# Patient Record
Sex: FEMALE | Race: WHITE | NOT HISPANIC OR LATINO
[De-identification: names, ages, dates, MRNs, and addresses within clinical notes are randomized per-mention and may not be internally consistent; named-entity substitution may affect disease eponyms.]

---

## 2018-12-28 ENCOUNTER — TRANSCRIPTION ENCOUNTER (OUTPATIENT)
Age: 58
End: 2018-12-28

## 2019-04-16 ENCOUNTER — TRANSCRIPTION ENCOUNTER (OUTPATIENT)
Age: 59
End: 2019-04-16

## 2022-04-11 ENCOUNTER — APPOINTMENT (OUTPATIENT)
Dept: ORTHOPEDIC SURGERY | Facility: CLINIC | Age: 62
End: 2022-04-11
Payer: OTHER MISCELLANEOUS

## 2022-06-13 ENCOUNTER — APPOINTMENT (OUTPATIENT)
Dept: ORTHOPEDIC SURGERY | Facility: CLINIC | Age: 62
End: 2022-06-13
Payer: OTHER MISCELLANEOUS

## 2022-06-13 VITALS — WEIGHT: 180 LBS | HEIGHT: 65 IN | BODY MASS INDEX: 29.99 KG/M2

## 2022-06-13 PROCEDURE — 99072 ADDL SUPL MATRL&STAF TM PHE: CPT

## 2022-06-13 PROCEDURE — 99213 OFFICE O/P EST LOW 20 MIN: CPT

## 2022-06-13 NOTE — DISCUSSION/SUMMARY
[de-identified] : The patient was advised of the diagnosis.  The natural history of the pathology was explained in full to the patient in layman's terms. All questions were answered.  The risks and benefits of surgical and non-surgical treatment alternatives were explained in full to the patient.\par \par The patient demonstrated a full understanding of the surgical and non-surgical options.  The risks of surgery were outlined in full to the patient including but not limited to bleeding, scarring, infection, sepsis, neurologic injury, vascular injury, failure to resolve symptoms, symptom recurrence, the need for further surgery, non-healing, wound breakdown, deep vein thrombosis, pulmonary embolism, spontaneous osteonecrosis, anesthesia complications and even death.  The patient understood all the risks and accepted them and understood that other complications could occur that are not mentioned above.  The intraoperative plan, post-operative plan, post-operative expectations and limitations were explained in full.  Expectations from non-surgical treatment were explained in full as well.  The patient demonstrated a complete understanding of the treatment alternatives and requested the above-mentioned procedure.  This will be scheduled accordingly.\par

## 2022-06-13 NOTE — ASSESSMENT
[FreeTextEntry1] : Previous doc:\par Adv PF OA. No significant damage seen medial and lateral compartment. 1 week relief from cortisone injections in the past. Currently getting HA injections but no longer helping like in the past. Her pain is out of proportion to the severity of OA that I see today. Recc she completes HA injections as scheduled and then get new MRI right knee to eval for progression of medial/lateral OA.\par 2/14/22: Cont pain - MRI reviewed and has very clear difference compared to old MRI in her anterior horn lateral meniscus - although tears in the ant horn lat meniscus are rare this could account for her symptoms, she does not have enough OA for TKA at this point and risks for her with a knee scope are low - recc proceed with right knee arthroscopy, partial lateral meniscectomy.\par \par 6/13/22: No change - significant right knee pain, need to proceed with knee arthroscopy, auth is pending.

## 2022-06-13 NOTE — HISTORY OF PRESENT ILLNESS
[Gradual] : gradual [Result of repetitive motion] : result of repetitive motion [8] : 8 [Dull/Aching] : dull/aching [Constant] : constant [Disabled] : Work status: disabled [de-identified] : 6/13/22: Cont pain - no auth for knee scope since last visit.\par \par Previous doc:\par WC 8/16/10.\par Mechanical fall at work in 2010 - sustained meniscus tear, had scope which helped initially. Mult injections postop with some relief but lately pain has been worsening and injections have not worked. She is currently getting euflexxa with Dr. Chavez and is scheduled to have inj #3 tomorrow. Hiatal hernia repair 2018 with postop PE.\par 2/14/22: Cont right knee pain, had MRI done after last visit. Euflexxa was done which helped a little but still has severe right knee pain. [] : no [de-identified] : inj

## 2022-06-13 NOTE — IMAGING
[de-identified] : Right Knee: Palpation of the knee is as follows: anterior tenderness. Knee Range of Motion is as follows: Extension: 0 degrees. Flexion: 125 degrees. Gait and function is as follows: patient ambulates without assistive device. \par

## 2022-06-22 ENCOUNTER — APPOINTMENT (OUTPATIENT)
Dept: ORTHOPEDIC SURGERY | Facility: CLINIC | Age: 62
End: 2022-06-22
Payer: MEDICARE

## 2022-06-22 VITALS — BODY MASS INDEX: 29.99 KG/M2 | HEIGHT: 65 IN | WEIGHT: 180 LBS

## 2022-06-22 PROCEDURE — 99213 OFFICE O/P EST LOW 20 MIN: CPT | Mod: 25

## 2022-06-22 PROCEDURE — 20610 DRAIN/INJ JOINT/BURSA W/O US: CPT | Mod: LT

## 2022-06-22 PROCEDURE — 73564 X-RAY EXAM KNEE 4 OR MORE: CPT | Mod: LT

## 2022-06-22 NOTE — PROCEDURE
[Large Joint Injection] : Large joint injection [Left] : of the left [Knee] : knee [Pain] : pain [Inflammation] : inflammation [X-ray evidence of Osteoarthritis on this or prior visit] : x-ray evidence of Osteoarthritis on this or prior visit [Repeat series performed] : repeat series performed [Alcohol] : alcohol [Betadine] : betadine [Ethyl Chloride sprayed topically] : ethyl chloride sprayed topically [Sterile technique used] : sterile technique used [___ cc    6mg] :  Betamethasone (Celestone) ~Vcc of 6mg [___ cc    1%] : Lidocaine ~Vcc of 1%  [] : Patient tolerated procedure well [Call if redness, pain or fever occur] : call if redness, pain or fever occur [Apply ice for 15min out of every hour for the next 12-24 hours as tolerated] : apply ice for 15 minutes out of every hour for the next 12-24 hours as tolerated [Patient was advised to rest the joint(s) for ____ days] : patient was advised to rest the joint(s) for [unfilled] days [Previous OTC use and PT nontherapeutic] : patient has tried OTC's including aspirin, Ibuprofen, Aleve, etc or prescription NSAIDS, and/or exercises at home and/or physical therapy without satisfactory response [Patient had decreased mobility in the joint] : patient had decreased mobility in the joint [Risks, benefits, alternatives discussed / Verbal consent obtained] : the risks benefits, and alternatives have been discussed, and verbal consent was obtained

## 2022-06-22 NOTE — ASSESSMENT
[FreeTextEntry1] : left knee OA\par xrays left knee 6/22/22 showing left knee PF mild to moderate degenerative changes.\par treatment options reviewed.\par She opted for CSI left knee. Tolerated well.\par Can consider visco if symptoms persist.

## 2022-06-22 NOTE — HISTORY OF PRESENT ILLNESS
[Gradual] : gradual [Result of repetitive motion] : result of repetitive motion [7] : 7 [Dull/Aching] : dull/aching [Constant] : constant [de-identified] : 6/22/22: 62 yo female here for the evaluation of her left knee. Pain worsening to the left knee over the last 6 months.Pain is worse at night and with bending. She is using voltaren gel without relief. Is seeing Dr Chaudhry for right knee pain and awaiting auth for knee arthroscopy. Pain from overcompensation. Recent spinal cord stimulator placed in 3/2022.  Cannot get an MRI. Renal insuffiecient and no NSAIDs as per nephrologist. She has been using tizanidine.  [] : no [de-identified] : cane

## 2022-06-22 NOTE — HISTORY OF PRESENT ILLNESS
[Gradual] : gradual [Result of repetitive motion] : result of repetitive motion [7] : 7 [Dull/Aching] : dull/aching [Constant] : constant [de-identified] : 6/22/22: 62 yo female here for the evaluation of her left knee. Pain worsening to the left knee over the last 6 months.Pain is worse at night and with bending. She is using voltaren gel without relief. Is seeing Dr Chaudhry for right knee pain and awaiting auth for knee arthroscopy. Pain from overcompensation. Recent spinal cord stimulator placed in 3/2022.  Cannot get an MRI. Renal insuffiecient and no NSAIDs as per nephrologist. She has been using tizanidine.  [] : no [de-identified] : cane

## 2022-06-22 NOTE — PHYSICAL EXAM
[Left] : left knee [NL (0)] : extension 0 degrees [5___] : quadriceps 5[unfilled]/5 [] : lateral joint line tenderness [TWNoteComboBox7] : flexion 135 degrees

## 2022-06-22 NOTE — REASON FOR VISIT
[FreeTextEntry2] : 06/22/2022 This is a 61 year female  present  today  left knee. Rt Knee will schedule TKA with Dr Chaudhry\monica \par

## 2022-07-11 ENCOUNTER — APPOINTMENT (OUTPATIENT)
Dept: ORTHOPEDIC SURGERY | Facility: CLINIC | Age: 62
End: 2022-07-11

## 2022-07-11 VITALS — BODY MASS INDEX: 29.99 KG/M2 | WEIGHT: 180 LBS | HEIGHT: 65 IN

## 2022-07-11 PROCEDURE — 99072 ADDL SUPL MATRL&STAF TM PHE: CPT

## 2022-07-11 PROCEDURE — 20611 DRAIN/INJ JOINT/BURSA W/US: CPT | Mod: RT

## 2022-07-11 PROCEDURE — 99214 OFFICE O/P EST MOD 30 MIN: CPT | Mod: 25

## 2022-07-11 PROCEDURE — J3490M: CUSTOM

## 2022-07-11 NOTE — IMAGING
[de-identified] : Right Knee: Palpation of the knee is as follows: anterior tenderness. Knee Range of Motion is as follows: Extension: 0 degrees. Flexion: 125 degrees. Gait and function is as follows: patient ambulates without assistive device. \par

## 2022-07-11 NOTE — DISCUSSION/SUMMARY
[de-identified] : The patient was advised of the diagnosis.  The natural history of the pathology was explained in full to the patient in layman's terms. All questions were answered.  The risks and benefits of surgical and non-surgical treatment alternatives were explained in full to the patient.\par

## 2022-07-11 NOTE — HISTORY OF PRESENT ILLNESS
[Work related] : work related [Sudden] : sudden [8] : 8 [Sharp] : sharp [Frequent] : frequent [de-identified] : 6/13/22: Cont pain - no auth for knee scope since last visit.\par \par Previous doc:\par WC 8/16/10.\par Mechanical fall at work in 2010 - sustained meniscus tear, had scope which helped initially. Mult injections postop with some relief but lately pain has been worsening and injections have not worked. She is currently getting euflexxa with Dr. Chavez and is scheduled to have inj #3 tomorrow. Hiatal hernia repair 2018 with postop PE.\par 2/14/22: Cont right knee pain, had MRI done after last visit. Euflexxa was done which helped a little but still has severe right knee pain. [] : no [FreeTextEntry3] : 8/16/10

## 2022-07-11 NOTE — ASSESSMENT
[FreeTextEntry1] : Previous doc:\par Adv PF OA. No significant damage seen medial and lateral compartment. 1 week relief from cortisone injections in the past. Currently getting HA injections but no longer helping like in the past. Her pain is out of proportion to the severity of OA that I see today. Recc she completes HA injections as scheduled and then get new MRI right knee to eval for progression of medial/lateral OA.\par 2/14/22: Cont pain - MRI reviewed and has very clear difference compared to old MRI in her anterior horn lateral meniscus - although tears in the ant horn lat meniscus are rare this could account for her symptoms, she does not have enough OA for TKA at this point and risks for her with a knee scope are low - recc proceed with right knee arthroscopy, partial lateral meniscectomy.\par 6/13/22: No change - significant right knee pain, need to proceed with knee arthroscopy, auth is pending.\par \par 7/11/22: Cont pain, still waiting for knee scope auth but she wants to proceed with this ASAP.  Inj today for acute pain relief tolerated well.

## 2022-07-11 NOTE — PROCEDURE
[FreeTextEntry3] : Large joint injection was performed on the right knee. The indication for this procedure was pain, inflammation, and x-ray evidence of Osteoarthritis on this or prior visit. The site was prepped with betadine, ethyl chloride sprayed topically, and sterile technique used. An injection of Lidocaine 3cc of 1% , Bupivacaine (Marcaine) 5cc of 0.25% , Methylprednisolone (Depomedrol) cc of 80 mg was used. Patient was advised to call if redness, pain, or fever occur, apply ice for 15 minutes out of every hour for the next 12-24 hours as tolerated and patient was advised to rest the joint(s) for days.\par Patient has tried OTC's including aspirin, Ibuprofen, Aleve, etc or prescription NSAIDS, and/or exercises at home and/or physical therapy without satisfactory response and patient had decreased mobility in the joint. Ultrasound guidance was indicated for this patient due to better visualize joint space. All ultrasound images have been permanently captured and stored accordingly in our picture.\par

## 2022-07-20 ENCOUNTER — APPOINTMENT (OUTPATIENT)
Dept: ORTHOPEDIC SURGERY | Facility: CLINIC | Age: 62
End: 2022-07-20

## 2022-07-20 VITALS — HEIGHT: 65 IN | WEIGHT: 180 LBS | BODY MASS INDEX: 29.99 KG/M2

## 2022-07-20 PROCEDURE — 99213 OFFICE O/P EST LOW 20 MIN: CPT | Mod: 25

## 2022-07-20 PROCEDURE — 20610 DRAIN/INJ JOINT/BURSA W/O US: CPT

## 2022-07-20 NOTE — HISTORY OF PRESENT ILLNESS
[8] : 8 [6] : 6 [de-identified] : Patient Complaint - \par last note - 12/22/21: 3rd euflexxa right knee\par 12/15/21: 2nd euflexxa right knee\par 12/1/21: 1st euflexxa right knee.\par  9/30/21: f/u on the right knee. PT reports still struggling with right knee pain. PT interested in repeating csi\par injections and gel injections.\par 3/26/21: Pt here for pain in the right knee. Get authorization for euflexxa in June, but never followed up. Requesting\par cortisone injection\par Pt. followed for RIGHT knee. Here today to rev updated MRI. Cortisone injection last visit helped but still has anterior\par and medial pain in her right knee. Taking aleve prn. Occ buckling. No locking.\par Reporting increase in pain to R knee\par  ji [FreeTextEntry1] : left knee

## 2022-07-20 NOTE — DISCUSSION/SUMMARY
[de-identified] : The documentation recorded by the scribe accurately reflects the service I personally performed and the decisions made by me.\par I, Maurilio Cade, attest that this documentation has been prepared under the direction and in the presence of Provider Zeeshan Chavez MD.\par \par The patient was seen by Zeeshan Chavez MD\par

## 2022-07-20 NOTE — ASSESSMENT
[FreeTextEntry1] : repeating euflexxa injections for the left knee. \par 7/20/22 1st injection euflexxa today. \par Apply ice to affected area.\par Return to office in 1 week.\par \par \par Questions addressed.\par

## 2022-07-20 NOTE — PHYSICAL EXAM
[Orientated] : orientated [Able to Communicate] : able to communicate [Normal Skin] : normal skin [Left] : left knee [NL (0)] : extension 0 degrees [5___] : quadriceps 5[unfilled]/5 [] : no calf tenderness [TWNoteComboBox7] : flexion 135 degrees

## 2022-08-03 ENCOUNTER — APPOINTMENT (OUTPATIENT)
Dept: ORTHOPEDIC SURGERY | Facility: CLINIC | Age: 62
End: 2022-08-03

## 2022-08-22 ENCOUNTER — APPOINTMENT (OUTPATIENT)
Dept: ORTHOPEDIC SURGERY | Facility: CLINIC | Age: 62
End: 2022-08-22

## 2022-08-22 VITALS — WEIGHT: 180 LBS | HEIGHT: 65 IN | BODY MASS INDEX: 29.99 KG/M2

## 2022-08-22 PROCEDURE — 99072 ADDL SUPL MATRL&STAF TM PHE: CPT

## 2022-08-22 PROCEDURE — 99213 OFFICE O/P EST LOW 20 MIN: CPT

## 2022-08-22 NOTE — HISTORY OF PRESENT ILLNESS
[Work related] : work related [9] : 9 [8] : 8 [Ice] : ice [Not working due to injury] : Work status: not working due to injury [de-identified] : 8/22/22: Continued and worsening pain in right knee - Had recent auth for knee scope \par \par Previous doc:\par WC 8/16/10.\par Mechanical fall at work in 2010 - sustained meniscus tear, had scope which helped initially. Mult injections postop with some relief but lately pain has been worsening and injections have not worked. She is currently getting euflexxa with Dr. Chavez and is scheduled to have inj #3 tomorrow. Hiatal hernia repair 2018 with postop PE.\par 2/14/22: Cont right knee pain, had MRI done after last visit. Euflexxa was done which helped a little but still has severe right knee pain.\par 6/13/22: Cont pain - no auth for knee scope since last visit. [FreeTextEntry1] : r knee [FreeTextEntry3] : 8/16/10 [de-identified] : cane

## 2022-08-22 NOTE — IMAGING
[de-identified] : Right Knee: Palpation of the knee is as follows: anterior tenderness. Knee Range of Motion is as follows: Extension: 0 degrees. Flexion: 125 degrees. Gait and function is as follows: patient ambulates without assistive device. \par

## 2022-08-22 NOTE — DISCUSSION/SUMMARY
[de-identified] : The patient has two pathologic conditions at this point.  There is a meniscus tear which is causing symptomology and there is also underlying osteoarthritis.  The patient was counseled that surgical intervention to address the torn meniscus will not change the symptomology attributable to the arthritis.  The patient was advised that the pain attributable to the meniscus tear should be resolved arthroscopically.  However, the patient should expect to have continued aches and pains related to the arthritis, in the form of, but not limited to pain, weather related changes, dull ache, crepitation, limitation of motion and strength and the need for further surgeries. The patient understands that the arthroscopic procedure addresses the meniscus only and that after surgery, the knee will not be 100% but it should be improved with respect to the symptomology attributed to the torn meniscus.  Patient also is aware that further treatment after arthroscopy may be necessary in the form of oral medications, cortisone and/or viscosupplementation injections, and further surgeries including knee replacement.  The patient agrees, understands and wishes to proceed.\par \par \par Entered by Latia Olson acting as scribe.\par

## 2022-08-22 NOTE — ASSESSMENT
[FreeTextEntry1] : Previous doc:\par Adv PF OA. No significant damage seen medial and lateral compartment. 1 week relief from cortisone injections in the past. Currently getting HA injections but no longer helping like in the past. Her pain is out of proportion to the severity of OA that I see today. Recc she completes HA injections as scheduled and then get new MRI right knee to eval for progression of medial/lateral OA.\par 2/14/22: Cont pain - MRI reviewed and has very clear difference compared to old MRI in her anterior horn lateral meniscus - although tears in the ant horn lat meniscus are rare this could account for her symptoms, she does not have enough OA for TKA at this point and risks for her with a knee scope are low - recc proceed with right knee arthroscopy, partial lateral meniscectomy.\par 6/13/22: No change - significant right knee pain, need to proceed with knee arthroscopy, auth is pending.\par 7/11/22: Cont pain, still waiting for knee scope auth but she wants to proceed with this ASAP.  Inj today for acute pain relief tolerated well.\par \par 8/22/22: Continued pain in right knee. Recently approved for knee scope and would like to proceed ASAP. Risks and benefits discussed. again explained with underlying OA may stil have some pain in the knee but I fel it will help her and is bettter option than TKA

## 2022-08-31 ENCOUNTER — APPOINTMENT (OUTPATIENT)
Dept: ORTHOPEDIC SURGERY | Facility: CLINIC | Age: 62
End: 2022-08-31
Payer: MEDICARE

## 2022-08-31 VITALS — BODY MASS INDEX: 29.99 KG/M2 | HEIGHT: 65 IN | WEIGHT: 180 LBS

## 2022-08-31 PROCEDURE — 20610 DRAIN/INJ JOINT/BURSA W/O US: CPT | Mod: LT

## 2022-08-31 PROCEDURE — 99213 OFFICE O/P EST LOW 20 MIN: CPT | Mod: 25

## 2022-08-31 NOTE — HISTORY OF PRESENT ILLNESS
[Euflexxa] : Euflexxa [de-identified] : \par 08/31/2022 2nd euflexxa left knee. \par \par last note - 07/20/2022: pt is here for follow up of left knee. pain level is worse.\par \par  12/22/21: 3rd euflexxa right knee\par \par 12/15/21: 2nd euflexxa right knee\par \par 12/1/21: 1st euflexxa right knee.\par \par  9/30/21: f/u on the right knee. PT reports still struggling with right knee pain. PT interested in repeating csi\par injections and gel injections.\par \par 3/26/21: Pt here for pain in the right knee. Get authorization for euflexxa in June, but never followed up. Requesting\par cortisone injection\par \par Pt. followed for RIGHT knee. Here today to rev updated MRI. Cortisone injection last visit helped but still has anterior\par and medial pain in her right knee. Taking aleve prn. Occ buckling. No locking.\par Reporting increase in pain to R knee\par  ji [2] : 2 [FreeTextEntry1] : left knee  [de-identified] : 07/20/2022

## 2022-08-31 NOTE — DISCUSSION/SUMMARY
[de-identified] : The documentation recorded by the scribe accurately reflects the service I personally performed and the decisions made by me.\par I, Maurilio Cade, attest that this documentation has been prepared under the direction and in the presence of Provider Zeeshan Chavze MD.\par \par

## 2022-08-31 NOTE — ASSESSMENT
[FreeTextEntry1] : repeating euflexxa injections for the left knee. \par 7/20/22 1st injection euflexxa today. \par Apply ice to affected area.\par Return to office in 1 week.\par Questions addressed.\par \par 08/31/2022 euflexxa #2 left knee. \par Apply ice to affected area.\par Return to office in 6 wks or prn. \par

## 2022-08-31 NOTE — PHYSICAL EXAM
[Orientated] : orientated [Able to Communicate] : able to communicate [Normal Skin] : normal skin [Left] : left knee [NL (0)] : extension 0 degrees [5___] : quadriceps 5[unfilled]/5 [] : ligamentously stable [TWNoteComboBox7] : flexion 135 degrees

## 2022-08-31 NOTE — PROCEDURE
[Large Joint Injection] : Large joint injection [Left] : of the left [Pain] : pain [X-ray evidence of Osteoarthritis on this or prior visit] : x-ray evidence of Osteoarthritis on this or prior visit [Euflexxa] : Euflexxa [#2] : series #2 [] : Patient tolerated procedure well [Call if redness, pain or fever occur] : call if redness, pain or fever occur [Apply ice for 15min out of every hour for the next 12-24 hours as tolerated] : apply ice for 15 minutes out of every hour for the next 12-24 hours as tolerated [Patient was advised to rest the joint(s) for ____ days] : patient was advised to rest the joint(s) for [unfilled] days [Risks, benefits, alternatives discussed / Verbal consent obtained] : the risks benefits, and alternatives have been discussed, and verbal consent was obtained [de-identified] :  The site was prepped with alcohol, betadine, ethyl chloride sprayed topically and sterile technique used. [FreeTextEntry3] : The risks, benefits, and alternatives to Viscosupplementation injection were explained in full to the patient. Risks outlined include but are not limited to infection, sepsis, bleeding, scarring, skin discoloration, temporary increase in pain, syncopal episode, failure to resolve symptoms, allergic reaction, and symptom recurrence. Signs and symptoms of infection reviewed and patient advised to call immediately for redness, fevers, and/or chills. Patient understood the risks. All questions were answered. After discussion of options, patient requested Viscosupplementation. Oral informed consent was obtained and sterile prep was done of the injection site. Sterile technique was used without complications. The patient tolerated the procedure well. Ice tonight to the injection site.\par \par

## 2022-09-07 ENCOUNTER — APPOINTMENT (OUTPATIENT)
Dept: ORTHOPEDIC SURGERY | Facility: CLINIC | Age: 62
End: 2022-09-07

## 2022-09-28 ENCOUNTER — APPOINTMENT (OUTPATIENT)
Dept: ORTHOPEDIC SURGERY | Facility: CLINIC | Age: 62
End: 2022-09-28

## 2022-09-28 VITALS — HEIGHT: 65 IN | BODY MASS INDEX: 29.99 KG/M2 | WEIGHT: 180 LBS

## 2022-09-28 PROCEDURE — 20610 DRAIN/INJ JOINT/BURSA W/O US: CPT

## 2022-09-28 NOTE — PROCEDURE
[Large Joint Injection] : Large joint injection [Left] : of the left [Knee] : knee [Pain] : pain [X-ray evidence of Osteoarthritis on this or prior visit] : x-ray evidence of Osteoarthritis on this or prior visit [Euflexxa] : Euflexxa [#3] : series #3 [] : Patient tolerated procedure well [Call if redness, pain or fever occur] : call if redness, pain or fever occur [Apply ice for 15min out of every hour for the next 12-24 hours as tolerated] : apply ice for 15 minutes out of every hour for the next 12-24 hours as tolerated [Patient was advised to rest the joint(s) for ____ days] : patient was advised to rest the joint(s) for [unfilled] days [Risks, benefits, alternatives discussed / Verbal consent obtained] : the risks benefits, and alternatives have been discussed, and verbal consent was obtained [de-identified] :  The site was prepped with alcohol, betadine, ethyl chloride sprayed topically and sterile technique used. [FreeTextEntry3] : The risks, benefits, and alternatives to Viscosupplementation injection were explained in full to the patient. Risks outlined include but are not limited to infection, sepsis, bleeding, scarring, skin discoloration, temporary increase in pain, syncopal episode, failure to resolve symptoms, allergic reaction, and symptom recurrence. Signs and symptoms of infection reviewed and patient advised to call immediately for redness, fevers, and/or chills. Patient understood the risks. All questions were answered. After discussion of options, patient requested Viscosupplementation. Oral informed consent was obtained and sterile prep was done of the injection site. Sterile technique was used without complications. The patient tolerated the procedure well. Ice tonight to the injection site.\par \par

## 2022-09-28 NOTE — DISCUSSION/SUMMARY
[de-identified] : The documentation recorded by the scribe accurately reflects the service I personally performed and the decisions made by me.\par I, Maurilio Cade, attest that this documentation has been prepared under the direction and in the presence of Provider Zeeshan Chavez MD.\par \par

## 2022-09-28 NOTE — HISTORY OF PRESENT ILLNESS
[] : This patient has had an injection before: yes [Euflexxa] : Euflexxa [2] : 2 [de-identified] : \par 09/28/2022 3rd euflexxa left knee. \par \par last note - 07/20/2022: pt is here for follow up of left knee. pain level is worse.\par \par  12/22/21: 3rd euflexxa right knee\par \par 12/15/21: 2nd euflexxa right knee\par \par 12/1/21: 1st euflexxa right knee.\par \par  9/30/21: f/u on the right knee. PT reports still struggling with right knee pain. PT interested in repeating csi\par injections and gel injections.\par \par 3/26/21: Pt here for pain in the right knee. Get authorization for euflexxa in June, but never followed up. Requesting\par cortisone injection\par \par Pt. followed for RIGHT knee. Here today to rev updated MRI. Cortisone injection last visit helped but still has anterior\par and medial pain in her right knee. Taking aleve prn. Occ buckling. No locking.\par Reporting increase in pain to R knee\par  ji [FreeTextEntry1] : left knee  [de-identified] : 07/20/2022

## 2022-09-29 ENCOUNTER — FORM ENCOUNTER (OUTPATIENT)
Age: 62
End: 2022-09-29

## 2022-10-11 ENCOUNTER — APPOINTMENT (OUTPATIENT)
Dept: ORTHOPEDIC SURGERY | Facility: CLINIC | Age: 62
End: 2022-10-11

## 2022-11-07 ENCOUNTER — LABORATORY RESULT (OUTPATIENT)
Age: 62
End: 2022-11-07

## 2022-11-11 ENCOUNTER — APPOINTMENT (OUTPATIENT)
Dept: ORTHOPEDIC SURGERY | Facility: AMBULATORY SURGERY CENTER | Age: 62
End: 2022-11-11
Payer: MEDICARE

## 2022-11-11 PROCEDURE — 29881 ARTHRS KNE SRG MNISECTMY M/L: CPT | Mod: AS,RT

## 2022-11-11 PROCEDURE — 29881 ARTHRS KNE SRG MNISECTMY M/L: CPT | Mod: RT

## 2022-11-11 RX ORDER — KRILL/OM-3/DHA/EPA/PHOSPHO/AST 1000-230MG
81 CAPSULE ORAL TWICE DAILY
Qty: 60 | Refills: 0 | Status: ACTIVE | COMMUNITY
Start: 2022-11-11 | End: 1900-01-01

## 2022-11-11 RX ORDER — OXYCODONE AND ACETAMINOPHEN 5; 325 MG/1; MG/1
5-325 TABLET ORAL EVERY 6 HOURS
Qty: 10 | Refills: 0 | Status: ACTIVE | COMMUNITY
Start: 2022-11-11 | End: 1900-01-01

## 2022-11-18 ENCOUNTER — APPOINTMENT (OUTPATIENT)
Dept: ORTHOPEDIC SURGERY | Facility: CLINIC | Age: 62
End: 2022-11-18

## 2022-11-18 DIAGNOSIS — Z98.890 OTHER SPECIFIED POSTPROCEDURAL STATES: ICD-10-CM

## 2022-11-18 PROCEDURE — 99024 POSTOP FOLLOW-UP VISIT: CPT

## 2022-11-18 NOTE — HISTORY OF PRESENT ILLNESS
[Work related] : work related [Dull/Aching] : dull/aching [Intermittent] : intermittent [Walking] : walking [] : yes [de-identified] : 11/18/22: 7 days s/p right knee arthroscopy w/ PM meniscectomy. Pain is well controlled \par \par Previous doc:\par WC 8/16/10.\par Mechanical fall at work in 2010 - sustained meniscus tear, had scope which helped initially. Mult injections postop with some relief but lately pain has been worsening and injections have not worked. She is currently getting euflexxa with Dr. Chavez and is scheduled to have inj #3 tomorrow. Hiatal hernia repair 2018 with postop PE.\par 2/14/22: Cont right knee pain, had MRI done after last visit. Euflexxa was done which helped a little but still has severe right knee pain.\par 6/13/22: Cont pain - no auth for knee scope since last visit.\par 8/22/22: Continued and worsening pain in right knee - Had recent auth for knee scope  [FreeTextEntry1] : right knee  [FreeTextEntry3] : 08/16/10 [FreeTextEntry5] : CARLOS is here today for post op #1 for right knee. pt states she is in pain today due to prolonged walking. pt states she is unable to do PT due to knee issue.  [de-identified] : 11/11/22 [de-identified] : right knee arthroscopy w/ PM meniscectomy

## 2022-11-18 NOTE — PHYSICAL EXAM
[de-identified] : RIGHT KNEE\par Incision is clean and dry with no drainage - sutures removed, steris applied \par Sensation intact\par Motor 5/5 \par +1 edema LE\par ROM 0-110\par

## 2022-11-18 NOTE — ASSESSMENT
[FreeTextEntry1] : Previous doc:\par Adv PF OA. No significant damage seen medial and lateral compartment. 1 week relief from cortisone injections in the past. Currently getting HA injections but no longer helping like in the past. Her pain is out of proportion to the severity of OA that I see today. Recc she completes HA injections as scheduled and then get new MRI right knee to eval for progression of medial/lateral OA.\par 2/14/22: Cont pain - MRI reviewed and has very clear difference compared to old MRI in her anterior horn lateral meniscus - although tears in the ant horn lat meniscus are rare this could account for her symptoms, she does not have enough OA for TKA at this point and risks for her with a knee scope are low - recc proceed with right knee arthroscopy, partial lateral meniscectomy.\par 6/13/22: No change - significant right knee pain, need to proceed with knee arthroscopy, auth is pending.\par 7/11/22: Cont pain, still waiting for knee scope auth but she wants to proceed with this ASAP.  Inj today for acute pain relief tolerated well.\par 8/22/22: Continued pain in right knee. Recently approved for knee scope and would like to proceed ASAP. Risks and benefits discussed. again explained with underlying OA may stil have some pain in the knee but I fel it will help her and is bettter option than TKA  \par \par 11/18/22: 1wk s/p R knee scope. Sutures removed, steris applied. Doing well today. Begin outpatient PT.

## 2023-02-13 ENCOUNTER — APPOINTMENT (OUTPATIENT)
Dept: ORTHOPEDIC SURGERY | Facility: CLINIC | Age: 63
End: 2023-02-13
Payer: OTHER MISCELLANEOUS

## 2023-02-13 DIAGNOSIS — S83.271A COMPLEX TEAR OF LATERAL MENISCUS, CURRENT INJURY, RIGHT KNEE, INITIAL ENCOUNTER: ICD-10-CM

## 2023-02-13 PROCEDURE — 99072 ADDL SUPL MATRL&STAF TM PHE: CPT

## 2023-02-13 PROCEDURE — 99213 OFFICE O/P EST LOW 20 MIN: CPT

## 2023-02-13 NOTE — PHYSICAL EXAM
[de-identified] : Right knee: Inc well healed.  No effusion.  Mild tenderness medial joint line.  ROM 0-110.  NVI.

## 2023-02-13 NOTE — HISTORY OF PRESENT ILLNESS
[Work related] : work related [Dull/Aching] : dull/aching [Intermittent] : intermittent [Walking] : walking [] : Post Surgical Visit: yes [de-identified] : 2/13/23: Minimal pain in knee.  Has stopped PT.\par \par Previous doc:\par WC 8/16/10.\par Mechanical fall at work in 2010 - sustained meniscus tear, had scope which helped initially. Mult injections postop with some relief but lately pain has been worsening and injections have not worked. She is currently getting euflexxa with Dr. Chavez and is scheduled to have inj #3 tomorrow. Hiatal hernia repair 2018 with postop PE.\par 2/14/22: Cont right knee pain, had MRI done after last visit. Euflexxa was done which helped a little but still has severe right knee pain.\par 6/13/22: Cont pain - no auth for knee scope since last visit.\par 8/22/22: Continued and worsening pain in right knee - Had recent auth for knee scope \par 11/18/22: 7 days s/p right knee arthroscopy w/ PM meniscectomy. Pain is well controlled  [FreeTextEntry1] : right knee  [FreeTextEntry3] : 08/16/10 [FreeTextEntry5] : CARLOS is here today for post op #1 for right knee. pt states she is in pain today due to prolonged walking. pt states she is unable to do PT due to knee issue.  [de-identified] : 11/11/22 [de-identified] : right knee arthroscopy w/ PM meniscectomy

## 2023-02-13 NOTE — DISCUSSION/SUMMARY
[de-identified] : The patient was advised of the diagnosis.  The natural history of the pathology was explained in full to the patient in layman's terms. All questions were answered.  The risks and benefits of surgical and non-surgical treatment alternatives were explained in full to the patient.\par \par Entered by Nestor Nixon PA-C working as a scribe for Dr. Chaudhry.\par

## 2023-02-13 NOTE — ASSESSMENT
[FreeTextEntry1] : Previous doc:\par Adv PF OA. No significant damage seen medial and lateral compartment. 1 week relief from cortisone injections in the past. Currently getting HA injections but no longer helping like in the past. Her pain is out of proportion to the severity of OA that I see today. Recc she completes HA injections as scheduled and then get new MRI right knee to eval for progression of medial/lateral OA.\par 2/14/22: Cont pain - MRI reviewed and has very clear difference compared to old MRI in her anterior horn lateral meniscus - although tears in the ant horn lat meniscus are rare this could account for her symptoms, she does not have enough OA for TKA at this point and risks for her with a knee scope are low - recc proceed with right knee arthroscopy, partial lateral meniscectomy.\par 6/13/22: No change - significant right knee pain, need to proceed with knee arthroscopy, auth is pending.\par 7/11/22: Cont pain, still waiting for knee scope auth but she wants to proceed with this ASAP.  Inj today for acute pain relief tolerated well.\par 8/22/22: Continued pain in right knee. Recently approved for knee scope and would like to proceed ASAP. Risks and benefits discussed. again explained with underlying OA may stil have some pain in the knee but I fel it will help her and is bettter option than TKA  \par 11/18/22: 1wk s/p R knee scope. Sutures removed, steris applied. Doing well today. Begin outpatient PT. \par \par 2/13/23: Minimal pain today, better than prior to surgery.  Cont HEP, return prn.  Offered HA injections again if pain worsens as she had some OA seen intraop but at this time she is doing well.

## 2023-04-27 ENCOUNTER — APPOINTMENT (OUTPATIENT)
Dept: ORTHOPEDIC SURGERY | Facility: CLINIC | Age: 63
End: 2023-04-27

## 2023-06-21 ENCOUNTER — APPOINTMENT (OUTPATIENT)
Dept: ORTHOPEDIC SURGERY | Facility: CLINIC | Age: 63
End: 2023-06-21
Payer: MEDICARE

## 2023-06-21 VITALS — HEIGHT: 63 IN | WEIGHT: 172 LBS | BODY MASS INDEX: 30.48 KG/M2

## 2023-06-21 PROCEDURE — 99213 OFFICE O/P EST LOW 20 MIN: CPT | Mod: 25

## 2023-06-21 PROCEDURE — 20610 DRAIN/INJ JOINT/BURSA W/O US: CPT | Mod: LT

## 2023-06-21 NOTE — HISTORY OF PRESENT ILLNESS
[6] : 6 [5] : 5 [] : yes [de-identified] : 06/21/23: Patient presents with exacerbation of left knee pain after falling 2 months ago. States the knee "popped." She was in rehab following and is now getting at home PT. Does note relief with Euflexxa injections in the past.\par \par 09/28/2022 3rd euflexxa left knee. \par \par last note - 07/20/2022: pt is here for follow up of left knee. pain level is worse.\par \par  12/22/21: 3rd euflexxa right knee\par \par 12/15/21: 2nd euflexxa right knee\par \par 12/1/21: 1st euflexxa right knee.\par \par  9/30/21: f/u on the right knee. PT reports still struggling with right knee pain. PT interested in repeating csi\par injections and gel injections.\par \par 3/26/21: Pt here for pain in the right knee. Get authorization for euflexxa in June, but never followed up. Requesting\par cortisone injection\par \par Pt. followed for RIGHT knee. Here today to rev updated MRI. Cortisone injection last visit helped but still has anterior\par and medial pain in her right knee. Taking alejennifer copelandn. Occ buckling. No locking.\par Reporting increase in pain to R knee\par  [FreeTextEntry1] : left knee  [FreeTextEntry5] : Pt is here for left knee follow up. Pt's knee popped on 4/25/23. Went to [facility], had x-rays. Pt had another fall a few days later, went back to [facility], had CT of the left knee.  [de-identified] : euflexxa

## 2023-06-21 NOTE — PROCEDURE
[FreeTextEntry3] : Viscosupplementation Injection: X-ray evidence of osteoarthritis on this or prior visit and patient has tried OTC's including aspirin, Ibuprofen, Aleve etc or prescription NSAIDS, and/or exercises at home and/ or physical therapy without satisfactory response. \par \par An injection of Orthovisc 2.5ml #1 was injected into the left knee(s) after verbal consent obtained. \par \par Patient has tried OTC's including aspirin, Ibuprofen, Aleve etc or prescription NSAIDS, and/or exercises at home and/ or physical therapy without satisfactory response and Patient has decreased mobility in the joint. The risks, benefits, and alternatives to cortisone injection were explained in full to the patient. Risks outlined include but are not limited to infection, sepsis, bleeding, scarring, skin discoloration, temporary increase in pain, syncopal episode, failure to resolve symptoms, allergic reaction, and symptom recurrence. Patient understands the risks. All questions were answered. After discussion of options, patient requested an injection. Oral informed consent was obtained. Sterile technique was utilized for the procedure including the preparation of the solutions used for the injection. Injection site was prepped with betadine and alcohol. Ethyl chloride sprayed topically. Sterile technique used. Patient tolerated procedure well. \par \par Post Procedure Instructions: Patient was advised to call if redness, pain, or fever occur. Apply ice for 15 min. every 2 hours for the next 12-24 hours as tolerated. Patient was advised to rest the joint(s) for 1-2 days.\par

## 2023-06-21 NOTE — ASSESSMENT
[FreeTextEntry1] : repeating euflexxa injections for the left knee. \par 7/20/22 1st injection euflexxa today. \par Apply ice to affected area.\par Return to office in 1 week.\par Questions addressed.\par \par 08/31/2022 euflexxa #2 left knee. \par Apply ice to affected area.\par Return to office in 6 wks or prn. \par \par Underlying pathology reviewed and treatment options discussed. \par 06/21/2023 : left knee xrays, 4 views,  reveal patellofemoral degenerative changes \par Start PT and HEP to improve mechanics and reduce pain. \par Activity modifier as tolerated. \par L CSI \par Questions addressed.\par f/u with Dr. Greer to discuss TKA \par  \par 06/21/23: Treatment options reviewed.\par She would like to try a different brand of visco.\par Return in 1 week to continue.\par \par Progress note completed by BABITA Macedo under the direct supervision of Zeeshan Chavez M.D.\par \par

## 2023-06-21 NOTE — PHYSICAL EXAM
[Orientated] : orientated [Able to Communicate] : able to communicate [Normal Skin] : normal skin [Left] : left knee [NL (0)] : extension 0 degrees [5___] : quadriceps 5[unfilled]/5 [] : no calf tenderness [TWNoteComboBox7] : flexion 135 degrees DISPLAY PLAN FREE TEXT

## 2023-07-05 ENCOUNTER — APPOINTMENT (OUTPATIENT)
Dept: ORTHOPEDIC SURGERY | Facility: CLINIC | Age: 63
End: 2023-07-05

## 2023-07-12 ENCOUNTER — APPOINTMENT (OUTPATIENT)
Dept: ORTHOPEDIC SURGERY | Facility: CLINIC | Age: 63
End: 2023-07-12

## 2023-07-19 ENCOUNTER — APPOINTMENT (OUTPATIENT)
Dept: ORTHOPEDIC SURGERY | Facility: CLINIC | Age: 63
End: 2023-07-19
Payer: MEDICARE

## 2023-07-19 PROCEDURE — 99213 OFFICE O/P EST LOW 20 MIN: CPT | Mod: 25

## 2023-07-19 PROCEDURE — 20610 DRAIN/INJ JOINT/BURSA W/O US: CPT | Mod: LT

## 2023-07-25 NOTE — ASSESSMENT
[FreeTextEntry1] : repeating euflexxa injections for the left knee. \par 7/20/22 1st injection euflexxa today. \par Apply ice to affected area.\par Return to office in 1 week.\par Questions addressed.\par \par 08/31/2022 euflexxa #2 left knee. \par Apply ice to affected area.\par Return to office in 6 wks or prn. \par \par Underlying pathology reviewed and treatment options discussed. \par 06/21/2023 : left knee xrays, 4 views,  reveal patellofemoral degenerative changes \par Start PT and HEP to improve mechanics and reduce pain. \par L CSI \par f/u with Dr. Greer to discuss TKA \par  \par 06/21/23: Treatment options reviewed.\par She would like to try a different brand of visco.\par Return in 1 week to continue.\par \par 07/19/2023 2nd orthovisc injection today left knee. \par Apply ice to affected area.\par Return to office in 1 week.\par \par

## 2023-07-25 NOTE — PROCEDURE
[FreeTextEntry3] : \par A Large joint injection was performed of the LEFT knee. An injection of Orthovisc, series #2 was used. The indication for this procedure was pain and x-ray evidence of Osteoarthritis on this or prior visit, and patient had decreased mobility in the joint. Risks, benefits and alternatives to procedure were discussed; Risks outlined include but are not limited to infection, sepsis, bleeding, scarring, skin discoloration, temporary increase in pain, syncopal episode, failure to resolve symptoms, allergic reaction, and symptom recurrence. All questions were answered to the patient's apparent satisfaction and informed consent obtained. The area of injection was prepared in a sterile fashion. Prior to injection a 'Time Out' was conducted in accordance with [employer] policy and the site and nature of procedure verified with the patient. \par  \par Procedure: \par The injection and aspiration was carried out utilizing sterile technique. The site was prepped with alcohol, betadine, ethyl chloride sprayed topically and sterile technique used.\par  \par (X) 2cc of Orthovisc \par \par Patient tolerated the procedure well and direct pressure was applied for hemostasis. The patient was reminded of potential post-injection risks including, but not limited to, delayed hypersensitivity reactions and/or infection. Ice tonight to the injection site.\par \par

## 2023-07-25 NOTE — HISTORY OF PRESENT ILLNESS
[6] : 6 [5] : 5 [] : This patient has had an injection before: yes [2] : 2 [Orthovisc] : Orthovisc [de-identified] : 07/19/2023 2nd Orthovisc injection left knee. \par \par 06/21/23: Patient presents with exacerbation of left knee pain after falling 2 months ago. States the knee "popped." She was in rehab following and is now getting at home PT. Does note relief with Euflexxa injections in the past.\par \par 09/28/2022 3rd euflexxa left knee. \par \par last note - 07/20/2022: pt is here for follow up of left knee. pain level is worse.\par \par  12/22/21: 3rd euflexxa right knee\par \par 12/15/21: 2nd euflexxa right knee\par \par 12/1/21: 1st euflexxa right knee.\par \par  9/30/21: f/u on the right knee. PT reports still struggling with right knee pain. PT interested in repeating csi\par injections and gel injections.\par \par 3/26/21: Pt here for pain in the right knee. Get authorization for euflexxa in June, but never followed up. Requesting\par cortisone injection\par \par Pt. followed for RIGHT knee. Here today to rev updated MRI. Cortisone injection last visit helped but still has anterior\par and medial pain in her right knee. Taking alejennifer copelandn. Occ buckling. No locking.\par Reporting increase in pain to R knee\par  [FreeTextEntry1] : left knee  [FreeTextEntry5] : Pt is here for left knee follow up. Pt's knee popped on 4/25/23. Went to [facility], had x-rays. Pt had another fall a few days later, went back to [facility], had CT of the left knee.  [de-identified] : orthovisc [de-identified] : 6/21/23 [de-identified] : LT knee

## 2023-07-26 ENCOUNTER — APPOINTMENT (OUTPATIENT)
Dept: ORTHOPEDIC SURGERY | Facility: CLINIC | Age: 63
End: 2023-07-26

## 2023-07-26 DIAGNOSIS — M25.562 PAIN IN LEFT KNEE: ICD-10-CM

## 2023-07-27 ENCOUNTER — APPOINTMENT (OUTPATIENT)
Dept: ORTHOPEDIC SURGERY | Facility: CLINIC | Age: 63
End: 2023-07-27
Payer: MEDICARE

## 2023-07-27 VITALS — WEIGHT: 172 LBS | BODY MASS INDEX: 30.48 KG/M2 | HEIGHT: 63 IN

## 2023-07-27 PROCEDURE — 99024 POSTOP FOLLOW-UP VISIT: CPT

## 2023-07-27 PROCEDURE — 20610 DRAIN/INJ JOINT/BURSA W/O US: CPT | Mod: LT

## 2023-07-27 NOTE — HISTORY OF PRESENT ILLNESS
[de-identified] : 07/26/2023 Orthovisc #3 left knee. \par \par 07/19/2023 2nd Orthovisc injection left knee. \par \par 06/21/23: Patient presents with exacerbation of left knee pain after falling 2 months ago. States the knee "popped." She was in rehab following and is now getting at home PT. Does note relief with Euflexxa injections in the past.\par \par 09/28/2022 3rd euflexxa left knee. \par \par last note - 07/20/2022: pt is here for follow up of left knee. pain level is worse.\par \par  12/22/21: 3rd euflexxa right knee\par \par 12/15/21: 2nd euflexxa right knee\par \par 12/1/21: 1st euflexxa right knee.\par \par  9/30/21: f/u on the right knee. PT reports still struggling with right knee pain. PT interested in repeating csi\par injections and gel injections.\par \par 3/26/21: Pt here for pain in the right knee. Get authorization for euflexxa in June, but never followed up. Requesting\par cortisone injection\par \par Pt. followed for RIGHT knee. Here today to rev updated MRI. Cortisone injection last visit helped but still has anterior\par and medial pain in her right knee. Taking aleve prn. Occ buckling. No locking.\par Reporting increase in pain to R knee\par  [3] : 3 [Orthovisc] : Orthovisc [de-identified] : 07/19/23 [de-identified] : left knee with medicare

## 2023-07-27 NOTE — PROCEDURE
[FreeTextEntry3] : \par A Large joint injection was performed of the LEFT knee. An injection of Orthovisc, series #3 was used. The indication for this procedure was pain and x-ray evidence of Osteoarthritis on this or prior visit, and patient had decreased mobility in the joint. Risks, benefits and alternatives to procedure were discussed; Risks outlined include but are not limited to infection, sepsis, bleeding, scarring, skin discoloration, temporary increase in pain, syncopal episode, failure to resolve symptoms, allergic reaction, and symptom recurrence. All questions were answered to the patient's apparent satisfaction and informed consent obtained. The area of injection was prepared in a sterile fashion. Prior to injection a 'Time Out' was conducted in accordance with [employer] policy and the site and nature of procedure verified with the patient. \par  \par Procedure: \par The injection and aspiration was carried out utilizing sterile technique. The site was prepped with alcohol, betadine, ethyl chloride sprayed topically and sterile technique used.\par  \par (X) 2cc of Orthovisc \par \par Patient tolerated the procedure well and direct pressure was applied for hemostasis. The patient was reminded of potential post-injection risks including, but not limited to, delayed hypersensitivity reactions and/or infection. Ice tonight to the injection site.\par \par

## 2023-07-27 NOTE — PHYSICAL EXAM
[Left] : left knee [NL (0)] : extension 0 degrees [5___] : quadriceps 5[unfilled]/5 [] : no calf tenderness [TWNoteComboBox7] : flexion 135 degrees

## 2023-07-27 NOTE — PHYSICAL EXAM
[Left] : left knee [NL (0)] : extension 0 degrees [5___] : quadriceps 5[unfilled]/5 [] : patient ambulates without assistive device [TWNoteComboBox7] : flexion 135 degrees

## 2023-07-27 NOTE — ASSESSMENT
[FreeTextEntry1] : repeating euflexxa injections for the left knee. \par 7/20/22 1st injection euflexxa today. \par Apply ice to affected area.\par Return to office in 1 week.\par Questions addressed.\par \par 08/31/2022 euflexxa #2 left knee. \par Apply ice to affected area.\par Return to office in 6 wks or prn. \par \par Underlying pathology reviewed and treatment options discussed. \par 06/21/2023 : left knee xrays, 4 views,  reveal patellofemoral degenerative changes \par Start PT and HEP to improve mechanics and reduce pain. \par L CSI \par f/u with Dr. Greer to discuss TKA \par  \par 06/21/23: Treatment options reviewed.\par She would like to try a different brand of visco.\par Return in 1 week to continue.\par \par 07/19/2023 2nd orthovisc injection today left knee. \par \par 07/26/2023 Orthovisc left knee #3 \par Apply ice to affected area.\par Return to office in 1 wk.\par \par Apply ice to affected area.\par Return to office in 1 week.\par \par

## 2023-07-27 NOTE — ASSESSMENT
[FreeTextEntry1] : repeating euflexxa injections for the left knee. \par 7/20/22 1st injection euflexxa today. \par Apply ice to affected area.\par Return to office in 1 week.\par Questions addressed.\par \par 08/31/2022 euflexxa #2 left knee. \par Apply ice to affected area.\par Return to office in 6 wks or prn. \par \par Underlying pathology reviewed and treatment options discussed. \par 06/21/2023 : left knee xrays, 4 views,  reveal patellofemoral degenerative changes \par Start PT and HEP to improve mechanics and reduce pain. \par L CSI \par f/u with Dr. Greer to discuss TKA \par  \par 06/21/23: Treatment options reviewed.\par She would like to try a different brand of visco.\par Return in 1 week to continue.\par \par 07/19/2023 2nd orthovisc injection today left knee. \par \par 07/27/2023 Orthovisc left knee #3 \par Apply ice to affected area.\par Return to office in 1 wk.\par \par Progress note completed by BABITA Macedo under the direct supervision of Zeeshan Chavez M.D.\par \par

## 2023-07-27 NOTE — HISTORY OF PRESENT ILLNESS
[Euflexxa] : Euflexxa [3] : 3 [de-identified] : 07/26/2023 Orthovisc #3 left knee. Symptoms continue. Denies complication with prior injection.\par \par 07/19/2023 2nd Orthovisc injection left knee. \par \par 06/21/23: Patient presents with exacerbation of left knee pain after falling 2 months ago. States the knee "popped." She was in rehab following and is now getting at home PT. Does note relief with Euflexxa injections in the past.\par \par 09/28/2022 3rd euflexxa left knee. \par \par last note - 07/20/2022: pt is here for follow up of left knee. pain level is worse.\par \par  12/22/21: 3rd euflexxa right knee\par \par 12/15/21: 2nd euflexxa right knee\par \par 12/1/21: 1st euflexxa right knee.\par \par  9/30/21: f/u on the right knee. PT reports still struggling with right knee pain. PT interested in repeating csi\par injections and gel injections.\par \par 3/26/21: Pt here for pain in the right knee. Get authorization for euflexxa in June, but never followed up. Requesting\par cortisone injection\par \par Pt. followed for RIGHT knee. Here today to rev updated MRI. Cortisone injection last visit helped but still has anterior\par and medial pain in her right knee. Taking aleve prn. Occ buckling. No locking.\par Reporting increase in pain to R knee\par  [] : Post Surgical Visit: no [de-identified] : 7/19/23 [de-identified] : left knee

## 2023-08-09 ENCOUNTER — APPOINTMENT (OUTPATIENT)
Dept: ORTHOPEDIC SURGERY | Facility: CLINIC | Age: 63
End: 2023-08-09
Payer: MEDICARE

## 2023-08-09 DIAGNOSIS — M17.12 UNILATERAL PRIMARY OSTEOARTHRITIS, LEFT KNEE: ICD-10-CM

## 2023-08-09 PROCEDURE — 99024 POSTOP FOLLOW-UP VISIT: CPT

## 2023-08-09 PROCEDURE — 20610 DRAIN/INJ JOINT/BURSA W/O US: CPT | Mod: LT

## 2023-08-09 NOTE — PROCEDURE
[FreeTextEntry3] : A Large joint injection was performed of the LEFT knee. An injection of Orthovisc, series #4 was used. The indication for this procedure was pain and x-ray evidence of Osteoarthritis on this or prior visit, and patient had decreased mobility in the joint. Risks, benefits and alternatives to procedure were discussed; Risks outlined include but are not limited to infection, sepsis, bleeding, scarring, skin discoloration, temporary increase in pain, syncopal episode, failure to resolve symptoms, allergic reaction, and symptom recurrence. All questions were answered to the patient's apparent satisfaction and informed consent obtained. The area of injection was prepared in a sterile fashion. Prior to injection a 'Time Out' was conducted in accordance with [employer] policy and the site and nature of procedure verified with the patient.    Procedure:  The injection and aspiration was carried out utilizing sterile technique. The site was prepped with alcohol, betadine, ethyl chloride sprayed topically and sterile technique used.   (X) 2cc of Orthovisc   Patient tolerated the procedure well and direct pressure was applied for hemostasis. The patient was reminded of potential post-injection risks including, but not limited to, delayed hypersensitivity reactions and/or infection. Ice tonight to the injection site.

## 2023-08-09 NOTE — HISTORY OF PRESENT ILLNESS
[4] : 4 [3] : 3 [Euflexxa] : Euflexxa [de-identified] : 08/09/2023: Orthovisc #4 left knee. Symptoms continue. Denies complication with prior injection.  07/26/2023 Orthovisc #3 left knee. Symptoms continue. Denies complication with prior injection.  07/19/2023 2nd Orthovisc injection left knee.   06/21/23: Patient presents with exacerbation of left knee pain after falling 2 months ago. States the knee "popped." She was in rehab following and is now getting at home PT. Does note relief with Euflexxa injections in the past.  09/28/2022 3rd euflexxa left knee.   last note - 07/20/2022: pt is here for follow up of left knee. pain level is worse.   12/22/21: 3rd euflexxa right knee  12/15/21: 2nd euflexxa right knee  12/1/21: 1st euflexxa right knee.   9/30/21: f/u on the right knee. PT reports still struggling with right knee pain. PT interested in repeating csi injections and gel injections.  3/26/21: Pt here for pain in the right knee. Get authorization for euflexxa in June, but never followed up. Requesting cortisone injection  Pt. followed for RIGHT knee. Here today to rev updated MRI. Cortisone injection last visit helped but still has anterior and medial pain in her right knee. Taking aleve prn. Occ buckling. No locking. Reporting increase in pain to R knee  [] : Post Surgical Visit: no [de-identified] : 7/26/2023 [de-identified] : left knee

## 2023-08-09 NOTE — ASSESSMENT
[FreeTextEntry1] : repeating euflexxa injections for the left knee.  7/20/22 1st injection euflexxa today.  Apply ice to affected area. Return to office in 1 week. Questions addressed.  08/31/2022 euflexxa #2 left knee.  Apply ice to affected area. Return to office in 6 wks or prn.   Underlying pathology reviewed and treatment options discussed.  06/21/2023 : left knee xrays, 4 views,  reveal patellofemoral degenerative changes  Start PT and HEP to improve mechanics and reduce pain.  L CSI  f/u with Dr. Greer to discuss TKA    06/21/23: Treatment options reviewed. She would like to try a different brand of visco. Return in 1 week to continue.  07/19/2023 2nd orthovisc injection today left knee.   07/27/2023 Orthovisc left knee #3  Apply ice to affected area. Return to office in 1 wk.  08/09/2023: Orthovisc left knee #4  Apply ice to affected area. Return to office in 6 weeks if symptoms persist.  Progress note completed by BABITA Macedo under the direct supervision of Zeeshan Chavez M.D.

## 2023-08-09 NOTE — HISTORY OF PRESENT ILLNESS
[4] : 4 [3] : 3 [Euflexxa] : Euflexxa [de-identified] : 08/09/2023: Orthovisc #4 left knee. Symptoms continue. Denies complication with prior injection.  07/26/2023 Orthovisc #3 left knee. Symptoms continue. Denies complication with prior injection.  07/19/2023 2nd Orthovisc injection left knee.   06/21/23: Patient presents with exacerbation of left knee pain after falling 2 months ago. States the knee "popped." She was in rehab following and is now getting at home PT. Does note relief with Euflexxa injections in the past.  09/28/2022 3rd euflexxa left knee.   last note - 07/20/2022: pt is here for follow up of left knee. pain level is worse.   12/22/21: 3rd euflexxa right knee  12/15/21: 2nd euflexxa right knee  12/1/21: 1st euflexxa right knee.   9/30/21: f/u on the right knee. PT reports still struggling with right knee pain. PT interested in repeating csi injections and gel injections.  3/26/21: Pt here for pain in the right knee. Get authorization for euflexxa in June, but never followed up. Requesting cortisone injection  Pt. followed for RIGHT knee. Here today to rev updated MRI. Cortisone injection last visit helped but still has anterior and medial pain in her right knee. Taking aleve prn. Occ buckling. No locking. Reporting increase in pain to R knee  [] : Post Surgical Visit: no [de-identified] : 7/26/2023 [de-identified] : left knee

## 2023-08-10 ENCOUNTER — APPOINTMENT (OUTPATIENT)
Dept: ORTHOPEDIC SURGERY | Facility: CLINIC | Age: 63
End: 2023-08-10

## 2023-08-17 ENCOUNTER — APPOINTMENT (OUTPATIENT)
Dept: ORTHOPEDIC SURGERY | Facility: CLINIC | Age: 63
End: 2023-08-17
Payer: OTHER MISCELLANEOUS

## 2023-08-17 VITALS — WEIGHT: 172 LBS | BODY MASS INDEX: 30.48 KG/M2 | HEIGHT: 63 IN

## 2023-08-17 DIAGNOSIS — S63.501A UNSPECIFIED SPRAIN OF RIGHT WRIST, INITIAL ENCOUNTER: ICD-10-CM

## 2023-08-17 DIAGNOSIS — M77.8 OTHER ENTHESOPATHIES, NOT ELSEWHERE CLASSIFIED: ICD-10-CM

## 2023-08-17 PROCEDURE — 99214 OFFICE O/P EST MOD 30 MIN: CPT | Mod: 25

## 2023-08-17 PROCEDURE — 20550 NJX 1 TENDON SHEATH/LIGAMENT: CPT | Mod: RT

## 2023-08-17 PROCEDURE — J3490M: CUSTOM

## 2023-08-17 RX ORDER — METHYLPREDNISOLONE 4 MG/1
4 TABLET ORAL
Qty: 1 | Refills: 0 | Status: ACTIVE | COMMUNITY
Start: 2023-08-17 | End: 1900-01-01

## 2023-08-17 NOTE — ASSESSMENT
[FreeTextEntry1] : She cannot take NSAIDS Brace MDP Therapy Return prn  R First dorsal compartment tendon sheath injection was performed because of pain inflammation and stiffness Anesthesia: ethyl chloride sprayed topically Celestone 6mg: An injection of Celestone 1cc Lidocaine: An injection of Lidocaine 1% 1cc Marcaine: An injection of Marcaine 0.5% 1cc  Patient has tried OTC's including aspirin, Ibuprofen, Aleve etc or prescription NSAIDS, and/or exercises at home and/ or physical therapy without satisfactory response. After verbal consent using sterile preparation and technique. The risks, benefits, and alternatives to cortisone injection were explained in full to the patient. Risks outlined include but are not limited to infection, sepsis, bleeding, scarring, skin discoloration, temporary increase in pain, syncopal episode, failure to resolve symptoms, allergic reaction, symptom recurrence, and elevation of blood sugar in diabetics. Patient understood the risks. All questions were answered. After discussion of options, patient requested an injection. Oral informed consent was obtained and sterile prep was done of the injection site. Sterile technique was utilized for the procedure including the preparation of the solutions used for the injection. Patient tolerated the procedure well. Advised to ice the injection site this evening. Prep with betadine locally to site. Sterile technique used

## 2023-08-17 NOTE — HISTORY OF PRESENT ILLNESS
[Work related] : work related [Sudden] : sudden [8] : 8 [7] : 7 [Dull/Aching] : dull/aching [Ice] : ice [Not working due to injury] : Work status: not working due to injury [] : yes [de-identified] : R wrist pain from WC 2010 She still has walt and stiffness  [FreeTextEntry1] : R WRIST [FreeTextEntry3] : 8/16/10 [FreeTextEntry5] : tripped and fell while holding a printer. shes been gardening recently and now having pain [de-identified] : activity [de-identified] : Dr. chavez  [de-identified] :

## 2024-06-12 ENCOUNTER — APPOINTMENT (OUTPATIENT)
Dept: ORTHOPEDIC SURGERY | Facility: CLINIC | Age: 64
End: 2024-06-12

## 2024-06-24 ENCOUNTER — APPOINTMENT (OUTPATIENT)
Dept: ORTHOPEDIC SURGERY | Facility: CLINIC | Age: 64
End: 2024-06-24
Payer: OTHER MISCELLANEOUS

## 2024-06-24 VITALS — WEIGHT: 187 LBS | HEIGHT: 63 IN | BODY MASS INDEX: 33.13 KG/M2

## 2024-06-24 DIAGNOSIS — M17.5 OTHER UNILATERAL SECONDARY OSTEOARTHRITIS OF KNEE: ICD-10-CM

## 2024-06-24 DIAGNOSIS — M17.11 UNILATERAL PRIMARY OSTEOARTHRITIS, RIGHT KNEE: ICD-10-CM

## 2024-06-24 PROCEDURE — 99204 OFFICE O/P NEW MOD 45 MIN: CPT

## 2024-06-24 PROCEDURE — 73564 X-RAY EXAM KNEE 4 OR MORE: CPT | Mod: RT

## 2024-06-24 RX ORDER — DICLOFENAC SODIUM 1% 10 MG/G
1 GEL TOPICAL DAILY
Qty: 1 | Refills: 2 | Status: ACTIVE | COMMUNITY
Start: 2024-06-24 | End: 1900-01-01

## 2024-06-25 PROBLEM — M17.5 OTHER SECONDARY OSTEOARTHRITIS OF RIGHT KNEE: Status: ACTIVE | Noted: 2024-06-25

## 2024-06-25 PROBLEM — M17.11 PRIMARY OSTEOARTHRITIS OF RIGHT KNEE: Status: ACTIVE | Noted: 2024-06-25

## 2024-07-09 ENCOUNTER — APPOINTMENT (OUTPATIENT)
Dept: ORTHOPEDIC SURGERY | Facility: CLINIC | Age: 64
End: 2024-07-09
Payer: OTHER MISCELLANEOUS

## 2024-07-09 VITALS — HEIGHT: 63 IN | BODY MASS INDEX: 33.13 KG/M2 | WEIGHT: 187 LBS

## 2024-07-09 DIAGNOSIS — M17.11 UNILATERAL PRIMARY OSTEOARTHRITIS, RIGHT KNEE: ICD-10-CM

## 2024-07-09 PROCEDURE — 99215 OFFICE O/P EST HI 40 MIN: CPT

## 2024-07-31 ENCOUNTER — APPOINTMENT (OUTPATIENT)
Dept: CT IMAGING | Facility: CLINIC | Age: 64
End: 2024-07-31
Payer: OTHER MISCELLANEOUS

## 2024-07-31 PROCEDURE — 73700 CT LOWER EXTREMITY W/O DYE: CPT | Mod: RT

## 2024-08-26 ENCOUNTER — NON-APPOINTMENT (OUTPATIENT)
Age: 64
End: 2024-08-26

## 2024-09-11 ENCOUNTER — OUTPATIENT (OUTPATIENT)
Dept: OUTPATIENT SERVICES | Facility: HOSPITAL | Age: 64
LOS: 1 days | Discharge: HOME | End: 2024-09-11
Payer: OTHER MISCELLANEOUS

## 2024-09-11 VITALS
WEIGHT: 202.83 LBS | DIASTOLIC BLOOD PRESSURE: 72 MMHG | OXYGEN SATURATION: 96 % | RESPIRATION RATE: 17 BRPM | HEIGHT: 63 IN | TEMPERATURE: 98 F | SYSTOLIC BLOOD PRESSURE: 116 MMHG | HEART RATE: 87 BPM

## 2024-09-11 DIAGNOSIS — Z01.818 ENCOUNTER FOR OTHER PREPROCEDURAL EXAMINATION: ICD-10-CM

## 2024-09-11 DIAGNOSIS — F41.9 ANXIETY DISORDER, UNSPECIFIED: ICD-10-CM

## 2024-09-11 DIAGNOSIS — Z98.89 OTHER SPECIFIED POSTPROCEDURAL STATES: Chronic | ICD-10-CM

## 2024-09-11 DIAGNOSIS — M17.11 UNILATERAL PRIMARY OSTEOARTHRITIS, RIGHT KNEE: ICD-10-CM

## 2024-09-11 LAB
ALBUMIN SERPL ELPH-MCNC: 3.8 G/DL — SIGNIFICANT CHANGE UP (ref 3.3–5)
ALP SERPL-CCNC: 101 U/L — SIGNIFICANT CHANGE UP (ref 40–120)
ALT FLD-CCNC: 20 U/L — SIGNIFICANT CHANGE UP (ref 12–78)
ANION GAP SERPL CALC-SCNC: 6 MMOL/L — SIGNIFICANT CHANGE UP (ref 5–17)
APTT BLD: 23.3 SEC — LOW (ref 24.5–35.6)
AST SERPL-CCNC: 15 U/L — SIGNIFICANT CHANGE UP (ref 15–37)
BILIRUB SERPL-MCNC: 0.5 MG/DL — SIGNIFICANT CHANGE UP (ref 0.2–1.2)
BUN SERPL-MCNC: 14 MG/DL — SIGNIFICANT CHANGE UP (ref 7–23)
CALCIUM SERPL-MCNC: 9.2 MG/DL — SIGNIFICANT CHANGE UP (ref 8.5–10.1)
CHLORIDE SERPL-SCNC: 115 MMOL/L — HIGH (ref 96–108)
CO2 SERPL-SCNC: 20 MMOL/L — LOW (ref 22–31)
CREAT SERPL-MCNC: 1.47 MG/DL — HIGH (ref 0.5–1.3)
EGFR: 39 ML/MIN/1.73M2 — LOW
GLUCOSE SERPL-MCNC: 115 MG/DL — HIGH (ref 70–99)
INR BLD: 0.85 RATIO — SIGNIFICANT CHANGE UP (ref 0.85–1.18)
MRSA PCR RESULT.: SIGNIFICANT CHANGE UP
POTASSIUM SERPL-MCNC: 3.7 MMOL/L — SIGNIFICANT CHANGE UP (ref 3.5–5.3)
POTASSIUM SERPL-SCNC: 3.7 MMOL/L — SIGNIFICANT CHANGE UP (ref 3.5–5.3)
PROT SERPL-MCNC: 7.8 GM/DL — SIGNIFICANT CHANGE UP (ref 6–8.3)
PROTHROM AB SERPL-ACNC: 10.2 SEC — SIGNIFICANT CHANGE UP (ref 9.5–13)
S AUREUS DNA NOSE QL NAA+PROBE: SIGNIFICANT CHANGE UP
SODIUM SERPL-SCNC: 141 MMOL/L — SIGNIFICANT CHANGE UP (ref 135–145)

## 2024-09-11 PROCEDURE — 93010 ELECTROCARDIOGRAM REPORT: CPT

## 2024-09-11 NOTE — H&P PST ADULT - PROBLEM SELECTOR PLAN 1
Pt co swelling to LLE. pt was here tuesday, diagnosed with cellulitis, pw worsening swelling and pain. swelling redness and warmth noted to LLE
scheduled for right  knee arthroplasty

## 2024-09-11 NOTE — H&P PST ADULT - NSICDXPASTSURGICALHX_GEN_ALL_CORE_FT
PAST SURGICAL HISTORY:  S/P arthroscopy of right knee     S/P      S/P lumbar laminectomy 3/16    S/P wrist surgery right

## 2024-09-11 NOTE — H&P PST ADULT - NSICDXPASTMEDICALHX_GEN_ALL_CORE_FT
PAST MEDICAL HISTORY:  Dyslipidemia (high LDL; low HDL)     Hiatal hernia     HTN (hypertension)     Insomnia     Migraine     Obesity (BMI 30-39.9)

## 2024-09-11 NOTE — H&P PST ADULT - ASSESSMENT
right knee osteoarthritis   CAPRINI SCORE    AGE RELATED RISK FACTORS                                                             [ ] Age 41-60 years                                            (1 Point)  [ x] Age: 61-74 years                                           (2 Points)                 [ ] Age= 75 years                                                (3 Points)             DISEASE RELATED RISK FACTORS                                                       [ ] Edema in the lower extremities                 (1 Point)                     [ ] Varicose veins                                               (1 Point)                                 [x ] BMI > 25 Kg/m2                                            (1 Point)                                  [ ] Serious infection (ie PNA)                            (1 Point)                     [ ] Lung disease ( COPD, Emphysema)            (1 Point)                                                                          [ ] Acute myocardial infarction                         (1 Point)                  [ ] Congestive heart failure (in the previous month)  (1 Point)         [ ] Inflammatory bowel disease                            (1 Point)                  [ ] Central venous access, PICC or Port               (2 points)       (within the last month)                                                                [ ] Stroke (in the previous month)                        (5 Points)    [ ] Previous or present malignancy                       (2 points)                                                                                                                                                         HEMATOLOGY RELATED FACTORS                                                         [ ] Prior episodes of VTE                                     (3 Points)                     [ ] Positive family history for VTE                      (3 Points)                  [ ] Prothrombin 70467 A                                     (3 Points)                     [ ] Factor V Leiden                                                (3 Points)                        [ ] Lupus anticoagulants                                      (3 Points)                                                           [ ] Anticardiolipin antibodies                              (3 Points)                                                       [ ] High homocysteine in the blood                   (3 Points)                                             [ ] Other congenital or acquired thrombophilia      (3 Points)                                                [ ] Heparin induced thrombocytopenia                  (3 Points)                                        MOBILITY RELATED FACTORS  [ ] Bed rest                                                         (1 Point)  [ ] Plaster cast                                                    (2 points)  [ ] Bed bound for more than 72 hours           (2 Points)    GENDER SPECIFIC FACTORS  [ ] Pregnancy or had a baby within the last month   (1 Point)  [ ] Post-partum < 6 weeks                                   (1 Point)  [ ] Hormonal therapy  or oral contraception   (1 Point)  [ ] History of pregnancy complications              (1 point)  [ ] Unexplained or recurrent              (1 Point)    OTHER RISK FACTORS                                           (1 Point)  [ ] BMI >40, smoking, diabetes requiring insulin, chemotherapy  blood transfusions and length of surgery over 2 hours    SURGERY RELATED RISK FACTORS  [ ]  Section within the last month     (1 Point)  [ ] Minor surgery                                                  (1 Point)  [ ] Arthroscopic surgery                                       (2 Points)  [ ] Planned major surgery lasting more            (2 Points)      than 45 minutes     [x ] Elective hip or knee joint replacement       (5 points)       surgery                                                TRAUMA RELATED RISK FACTORS  [ ] Fracture of the hip, pelvis, or leg                       (5 Points)  [ ] Spinal cord injury resulting in paralysis             (5 points)       (in the previous month)    [ ] Paralysis  (less than 1 month)                             (5 Points)  [ ] Multiple Trauma within 1 month                        (5 Points)    Total Score [   8     ]    Caprini Score 0-2: Low Risk, NO VTE prophylaxis required for most patients, encourage ambulation  Caprini Score 3-6: Moderate Risk , pharmacologic VTE prophylaxis is indicated for most patients (in the absence of contraindications)  Caprini Score Greater than or =7: High risk, pharmocologic VTE prophylaxis indicated for most patients (in the absence of contraindications)

## 2024-09-11 NOTE — H&P PST ADULT - HISTORY OF PRESENT ILLNESS
63 yo female , pmh- htn, hld, anxiety c/o right knee pain  2/2 osteoarthritis - scheduled for right knee arthroplasty   goal: to walk without cane   denies recent travels in the past 30 days. No fever, SOB, cough, flu like symptoms or body rash- covid screen

## 2024-09-11 NOTE — OCCUPATIONAL THERAPY INITIAL EVALUATION ADULT - PERTINENT HX OF CURRENT PROBLEM, REHAB EVAL
Pain and OA right knee. Pt is scheduled for a right TKA with Dr. Chavarria at [facility] on 9/27/24.

## 2024-09-11 NOTE — H&P PST ADULT - NSICDXFAMILYHX_GEN_ALL_CORE_FT
FAMILY HISTORY:  Father  Still living? Unknown  History of hypertension, Age at diagnosis: Age Unknown    Mother  Still living? Unknown  History of hypertension, Age at diagnosis: Age Unknown

## 2024-09-11 NOTE — OCCUPATIONAL THERAPY INITIAL EVALUATION ADULT - ADDITIONAL COMMENTS
Pt reports she lives alone in a private house with 3 steps to enter with close bilateral rails & 13 steps with a left rail to navigate inside the house. Pt has a tub/shower combo (jaccuzzi tub with cut out step), grab bars, standard toilet with RTS w/ arms. Pt is independent with ADLs and mobility using a cane. Pt has no recent PT, no falls and no leg buckling. Pt wears glasses, drives & has PMH right knee surgeries.

## 2024-09-12 ENCOUNTER — NON-APPOINTMENT (OUTPATIENT)
Age: 64
End: 2024-09-12

## 2024-09-12 LAB — VIT D25+D1,25 OH+D1,25 PNL SERPL-MCNC: 49.8 PG/ML — SIGNIFICANT CHANGE UP (ref 19.9–79.3)

## 2024-09-27 ENCOUNTER — APPOINTMENT (OUTPATIENT)
Dept: ORTHOPEDIC SURGERY | Facility: HOSPITAL | Age: 64
End: 2024-09-27
Payer: OTHER MISCELLANEOUS

## 2024-09-27 PROCEDURE — 20985 CPTR-ASST DIR MS PX: CPT

## 2024-09-27 PROCEDURE — 27447 TOTAL KNEE ARTHROPLASTY: CPT | Mod: RT

## 2024-10-02 ENCOUNTER — TRANSCRIPTION ENCOUNTER (OUTPATIENT)
Age: 64
End: 2024-10-02

## 2024-10-04 ENCOUNTER — NON-APPOINTMENT (OUTPATIENT)
Age: 64
End: 2024-10-04

## 2024-10-22 ENCOUNTER — APPOINTMENT (OUTPATIENT)
Dept: ORTHOPEDIC SURGERY | Facility: CLINIC | Age: 64
End: 2024-10-22

## 2024-10-29 ENCOUNTER — APPOINTMENT (OUTPATIENT)
Dept: ORTHOPEDIC SURGERY | Facility: CLINIC | Age: 64
End: 2024-10-29
Payer: OTHER MISCELLANEOUS

## 2024-10-29 VITALS — HEIGHT: 63 IN | BODY MASS INDEX: 33.13 KG/M2 | WEIGHT: 187 LBS

## 2024-10-29 DIAGNOSIS — Z96.651 PRESENCE OF RIGHT ARTIFICIAL KNEE JOINT: ICD-10-CM

## 2024-10-29 PROCEDURE — 73562 X-RAY EXAM OF KNEE 3: CPT | Mod: RT

## 2024-10-29 PROCEDURE — 99024 POSTOP FOLLOW-UP VISIT: CPT

## 2024-11-01 ENCOUNTER — NON-APPOINTMENT (OUTPATIENT)
Age: 64
End: 2024-11-01

## 2024-11-04 RX ORDER — OXYCODONE 5 MG/1
5 TABLET ORAL
Qty: 30 | Refills: 0 | Status: ACTIVE | COMMUNITY
Start: 2024-11-04 | End: 1900-01-01

## 2024-11-19 ENCOUNTER — APPOINTMENT (OUTPATIENT)
Dept: ORTHOPEDIC SURGERY | Facility: CLINIC | Age: 64
End: 2024-11-19
Payer: OTHER MISCELLANEOUS

## 2024-11-19 DIAGNOSIS — Z96.651 PRESENCE OF RIGHT ARTIFICIAL KNEE JOINT: ICD-10-CM

## 2024-11-19 PROCEDURE — 73562 X-RAY EXAM OF KNEE 3: CPT | Mod: RT

## 2024-11-19 PROCEDURE — 99024 POSTOP FOLLOW-UP VISIT: CPT

## 2024-11-19 RX ORDER — OXYCODONE 5 MG/1
5 TABLET ORAL EVERY 6 HOURS
Qty: 56 | Refills: 0 | Status: ACTIVE | COMMUNITY
Start: 2024-11-19 | End: 1900-01-01

## 2024-12-31 ENCOUNTER — APPOINTMENT (OUTPATIENT)
Dept: ORTHOPEDIC SURGERY | Facility: CLINIC | Age: 64
End: 2024-12-31